# Patient Record
Sex: MALE | Race: BLACK OR AFRICAN AMERICAN | NOT HISPANIC OR LATINO | Employment: FULL TIME | URBAN - METROPOLITAN AREA
[De-identification: names, ages, dates, MRNs, and addresses within clinical notes are randomized per-mention and may not be internally consistent; named-entity substitution may affect disease eponyms.]

---

## 2024-05-07 ENCOUNTER — HOSPITAL ENCOUNTER (OUTPATIENT)
Facility: HOSPITAL | Age: 44
Setting detail: OBSERVATION
Discharge: HOME | End: 2024-05-08
Attending: EMERGENCY MEDICINE
Payer: COMMERCIAL

## 2024-05-07 DIAGNOSIS — T78.3XXA ACE INHIBITOR-AGGRAVATED ANGIOEDEMA, INITIAL ENCOUNTER: Primary | ICD-10-CM

## 2024-05-07 DIAGNOSIS — T46.4X5A ACE INHIBITOR-AGGRAVATED ANGIOEDEMA, INITIAL ENCOUNTER: Primary | ICD-10-CM

## 2024-05-07 PROCEDURE — 99285 EMERGENCY DEPT VISIT HI MDM: CPT

## 2024-05-07 PROCEDURE — G0378 HOSPITAL OBSERVATION PER HR: HCPCS

## 2024-05-07 PROCEDURE — 99284 EMERGENCY DEPT VISIT MOD MDM: CPT | Performed by: EMERGENCY MEDICINE

## 2024-05-07 RX ORDER — ONDANSETRON HYDROCHLORIDE 2 MG/ML
4 INJECTION, SOLUTION INTRAVENOUS EVERY 8 HOURS PRN
Status: DISCONTINUED | OUTPATIENT
Start: 2024-05-07 | End: 2024-05-08 | Stop reason: HOSPADM

## 2024-05-07 RX ORDER — ACETAMINOPHEN 325 MG/1
650 TABLET ORAL EVERY 4 HOURS PRN
Status: DISCONTINUED | OUTPATIENT
Start: 2024-05-07 | End: 2024-05-08 | Stop reason: HOSPADM

## 2024-05-07 RX ORDER — ACETAMINOPHEN 160 MG/5ML
650 SOLUTION ORAL EVERY 4 HOURS PRN
Status: DISCONTINUED | OUTPATIENT
Start: 2024-05-07 | End: 2024-05-08 | Stop reason: HOSPADM

## 2024-05-07 RX ORDER — DIPHENHYDRAMINE HYDROCHLORIDE 50 MG/ML
25 INJECTION INTRAMUSCULAR; INTRAVENOUS EVERY 6 HOURS PRN
Status: DISCONTINUED | OUTPATIENT
Start: 2024-05-07 | End: 2024-05-08 | Stop reason: HOSPADM

## 2024-05-07 RX ORDER — ONDANSETRON 4 MG/1
4 TABLET, ORALLY DISINTEGRATING ORAL EVERY 8 HOURS PRN
Status: DISCONTINUED | OUTPATIENT
Start: 2024-05-07 | End: 2024-05-08 | Stop reason: HOSPADM

## 2024-05-07 RX ORDER — ACETAMINOPHEN 650 MG/1
650 SUPPOSITORY RECTAL EVERY 4 HOURS PRN
Status: DISCONTINUED | OUTPATIENT
Start: 2024-05-07 | End: 2024-05-08 | Stop reason: HOSPADM

## 2024-05-07 RX ORDER — ALBUTEROL SULFATE 0.83 MG/ML
3 SOLUTION RESPIRATORY (INHALATION)
Status: DISCONTINUED | OUTPATIENT
Start: 2024-05-07 | End: 2024-05-08 | Stop reason: HOSPADM

## 2024-05-07 RX ORDER — DIPHENHYDRAMINE HCL 25 MG
25 CAPSULE ORAL EVERY 6 HOURS PRN
Status: DISCONTINUED | OUTPATIENT
Start: 2024-05-07 | End: 2024-05-08 | Stop reason: HOSPADM

## 2024-05-07 RX ORDER — FAMOTIDINE 10 MG/ML
20 INJECTION INTRAVENOUS EVERY 12 HOURS PRN
Status: DISCONTINUED | OUTPATIENT
Start: 2024-05-07 | End: 2024-05-08 | Stop reason: HOSPADM

## 2024-05-07 ASSESSMENT — PAIN SCALES - GENERAL: PAINLEVEL_OUTOF10: 3

## 2024-05-07 ASSESSMENT — PAIN DESCRIPTION - PAIN TYPE: TYPE: ACUTE PAIN

## 2024-05-07 ASSESSMENT — PAIN - FUNCTIONAL ASSESSMENT: PAIN_FUNCTIONAL_ASSESSMENT: 0-10

## 2024-05-07 ASSESSMENT — PAIN DESCRIPTION - LOCATION: LOCATION: FACE

## 2024-05-07 ASSESSMENT — COLUMBIA-SUICIDE SEVERITY RATING SCALE - C-SSRS
6. HAVE YOU EVER DONE ANYTHING, STARTED TO DO ANYTHING, OR PREPARED TO DO ANYTHING TO END YOUR LIFE?: NO
2. HAVE YOU ACTUALLY HAD ANY THOUGHTS OF KILLING YOURSELF?: NO
1. IN THE PAST MONTH, HAVE YOU WISHED YOU WERE DEAD OR WISHED YOU COULD GO TO SLEEP AND NOT WAKE UP?: NO

## 2024-05-07 NOTE — Clinical Note
Pepe Gibbs was seen and treated in our emergency department on 5/7/2024.  He may return to work on 05/10/2024.       If you have any questions or concerns, please don't hesitate to call.      Nas Woodruff PA-C

## 2024-05-07 NOTE — ED TRIAGE NOTES
Pt to ED with c/o L lower lip and L cheek swelling. Pt took his morning meds at 0600 this morning and then the swelling began around 11 am. Pt denies any difficulties swallowing/breathing.

## 2024-05-08 VITALS
BODY MASS INDEX: 20.53 KG/M2 | RESPIRATION RATE: 16 BRPM | OXYGEN SATURATION: 98 % | HEART RATE: 62 BPM | SYSTOLIC BLOOD PRESSURE: 115 MMHG | TEMPERATURE: 97.7 F | WEIGHT: 160 LBS | HEIGHT: 74 IN | DIASTOLIC BLOOD PRESSURE: 85 MMHG

## 2024-05-08 PROCEDURE — 2500000002 HC RX 250 W HCPCS SELF ADMINISTERED DRUGS (ALT 637 FOR MEDICARE OP, ALT 636 FOR OP/ED): Mod: SE

## 2024-05-08 PROCEDURE — 2500000006 HC RX 250 W HCPCS SELF ADMINISTERED DRUGS (ALT 637 FOR ALL PAYERS): Mod: SE

## 2024-05-08 PROCEDURE — S4991 NICOTINE PATCH NONLEGEND: HCPCS | Mod: SE

## 2024-05-08 PROCEDURE — 2500000004 HC RX 250 GENERAL PHARMACY W/ HCPCS (ALT 636 FOR OP/ED): Mod: SE

## 2024-05-08 PROCEDURE — 96374 THER/PROPH/DIAG INJ IV PUSH: CPT

## 2024-05-08 PROCEDURE — 96375 TX/PRO/DX INJ NEW DRUG ADDON: CPT

## 2024-05-08 PROCEDURE — G0378 HOSPITAL OBSERVATION PER HR: HCPCS

## 2024-05-08 RX ORDER — LISINOPRIL 40 MG/1
40 TABLET ORAL
COMMUNITY
Start: 2024-04-06 | End: 2024-05-08

## 2024-05-08 RX ORDER — SPIRONOLACTONE 25 MG/1
25 TABLET ORAL
Status: DISCONTINUED | OUTPATIENT
Start: 2024-05-08 | End: 2024-05-08 | Stop reason: HOSPADM

## 2024-05-08 RX ORDER — IBUPROFEN 200 MG
1 TABLET ORAL EVERY 24 HOURS
Status: DISCONTINUED | OUTPATIENT
Start: 2024-05-08 | End: 2024-05-08 | Stop reason: HOSPADM

## 2024-05-08 RX ORDER — DIPHENHYDRAMINE HCL 25 MG
1 CAPSULE ORAL AS NEEDED
COMMUNITY
Start: 2023-04-03

## 2024-05-08 RX ORDER — METOPROLOL SUCCINATE 50 MG/1
100 TABLET, EXTENDED RELEASE ORAL DAILY
Status: DISCONTINUED | OUTPATIENT
Start: 2024-05-08 | End: 2024-05-08 | Stop reason: HOSPADM

## 2024-05-08 RX ORDER — IBUPROFEN 200 MG
1 TABLET ORAL EVERY 24 HOURS
COMMUNITY
Start: 2024-04-16

## 2024-05-08 RX ORDER — DIPHENHYDRAMINE HCL 25 MG
25 CAPSULE ORAL EVERY 8 HOURS PRN
Qty: 30 CAPSULE | Refills: 0 | Status: SHIPPED | OUTPATIENT
Start: 2024-05-08

## 2024-05-08 RX ORDER — METOPROLOL SUCCINATE 100 MG/1
100 TABLET, EXTENDED RELEASE ORAL
COMMUNITY
Start: 2024-04-05

## 2024-05-08 RX ORDER — ALBUTEROL SULFATE 90 UG/1
2 AEROSOL, METERED RESPIRATORY (INHALATION) EVERY 4 HOURS PRN
COMMUNITY
Start: 2023-05-03

## 2024-05-08 RX ORDER — TALC
3 POWDER (GRAM) TOPICAL NIGHTLY PRN
COMMUNITY
Start: 2024-04-16 | End: 2025-01-11

## 2024-05-08 RX ORDER — SPIRONOLACTONE 25 MG/1
25 TABLET ORAL
COMMUNITY
Start: 2024-04-06

## 2024-05-08 RX ORDER — MICONAZOLE NITRATE 2 %
2 CREAM (GRAM) TOPICAL
COMMUNITY
Start: 2024-04-16

## 2024-05-08 RX ADMIN — SPIRONOLACTONE 25 MG: 25 TABLET, FILM COATED ORAL at 07:11

## 2024-05-08 RX ADMIN — FAMOTIDINE 20 MG: 10 INJECTION INTRAVENOUS at 03:29

## 2024-05-08 RX ADMIN — NICOTINE 1 PATCH: 21 PATCH, EXTENDED RELEASE TRANSDERMAL at 05:47

## 2024-05-08 RX ADMIN — DIPHENHYDRAMINE HYDROCHLORIDE 25 MG: 50 INJECTION INTRAMUSCULAR; INTRAVENOUS at 03:29

## 2024-05-08 NOTE — DISCHARGE SUMMARY
Discharge Diagnosis  ACE inhibitor-aggravated angioedema, initial encounter      Disposition Note  [facility] CLINICAL DECISION  Patient: Pepe Gibbs Jr.  MRN: [id number]  : 1980  Date of Evaluation: 2024  ED Provider: Nas Woodruff PA-C      Limitations to history: None  Independent Historian: Yes  External Records Reviewed: Recent and relevant inpatient and outpatient notes in EMR      Subjective:    Pepe Gibbs Jr. is a 44 y.o. male has undergone comprehensive diagnostic evaluation and therapeutic management in accordance with the CDU guidelines for angioedema of the lower lip. Based on Mr. Sai JASSO clinical response and diagnostic information during this period of observation, it has been determined that the patient will be discharged.    The acute evaluation included:  Orders Placed This Encounter   Procedures    Adult diet Regular    Weight on admission    Height on admission    Nursing swallow assessment    Activity (specify) Ambulate    Vital Signs    Telemetry monitoring    Notify provider    Pain Assessment    Monitor airway    Full code    Pulse oximetry, continuous    Electrocardiogram, 12-lead PRN ACS symptoms    Insert and maintain peripheral IV    Saline lock IV    Send to CDU    Initiate observation status         Placed in observation at: 2125       Past History   No past medical history on file.  No past surgical history on file.  Social History     Socioeconomic History    Marital status: Single     Spouse name: Not on file    Number of children: Not on file    Years of education: Not on file    Highest education level: Not on file   Occupational History    Not on file   Tobacco Use    Smoking status: Not on file    Smokeless tobacco: Not on file   Substance and Sexual Activity    Alcohol use: Not on file    Drug use: Not on file    Sexual activity: Not on file   Other Topics Concern    Not on file   Social History Narrative    Not on file     Social  Determinants of Health     Financial Resource Strain: High Risk (4/17/2024)    Received from [employer]    Overall Financial Resource Strain (CARDIA)     Difficulty of Paying Living Expenses: Hard   Food Insecurity: Food Insecurity Present (4/17/2024)    Received from [employer]    Hunger Vital Sign     Worried About Running Out of Food in the Last Year: Often true     Ran Out of Food in the Last Year: Often true   Transportation Needs: No Transportation Needs (4/17/2024)    Received from [employer]    PRAPARE - Transportation     Lack of Transportation (Medical): No     Lack of Transportation (Non-Medical): No   Physical Activity: Sufficiently Active (4/17/2024)    Received from [employer]    Exercise Vital Sign     Days of Exercise per Week: 7 days     Minutes of Exercise per Session: 30 min   Stress: No Stress Concern Present (4/17/2024)    Received from [employer]    [place] [employer] - Occupational Stress Questionnaire     Feeling of Stress : Only a little   Social Connections: Moderately Isolated (4/17/2024)    Received from [employer]    Social Connection and Isolation Panel [NHANES]     Frequency of Communication with Friends and Family: More than three times a week     Frequency of Social Gatherings with Friends and Family: Twice a week     Attends [employer] Services: More than 4 times per year     Active Member of Clubs or Organizations: No     Attends Club or Organization Meetings: Never     Marital Status: Never    Intimate Partner Violence: Not At Risk (4/17/2024)    Received from [employer]    Humiliation, Afraid, Rape, and Kick questionnaire     Fear of Current or Ex-Partner: No     Emotionally Abused: No     Physically Abused: No     Sexually Abused: No   Housing Stability: Low Risk  (4/17/2024)    Received from [employer]    Housing Stability Vital Sign     Unable to Pay for Housing in the Last Year: No     Number of Places Lived in the Last Year: 1     Unstable  "Housing in the Last Year: No         Medications/Allergies     Previous Medications    ALBUTEROL 90 MCG/ACTUATION INHALER    Inhale 2 puffs every 4 hours if needed for wheezing or shortness of breath.    MELATONIN 3 MG TABLET    Take 1 tablet (3 mg) by mouth as needed at bedtime for sleep.    METOPROLOL SUCCINATE XL (TOPROL-XL) 100 MG 24 HR TABLET    Take 1 tablet (100 mg) by mouth once daily.    NICOTINE (NICODERM CQ) 21 MG/24 HR PATCH    Place 1 patch on the skin once every 24 hours.    NICOTINE POLACRILEX (NICORETTE) 2 MG GUM    Take 1 each (2 mg) by mouth.    NICOTINE POLACRILEX (NICORETTE) 4 MG GUM    Take 1 each (4 mg) by mouth if needed.    SPIRONOLACTONE (ALDACTONE) 25 MG TABLET    Take 1 tablet (25 mg) by mouth once daily.     Allergies   Allergen Reactions    Tree Nuts Anaphylaxis    Lisinopril Angioedema         Review of Systems  All systems reviewed and otherwise negative, except as stated above in HPI.    Diagnostics reviewed by Nas Woodruff PA-C     Labs:  No results found for this or any previous visit.  Radiographs:  No orders to display           Physical Exam     Visit Vitals  /85 (BP Location: Right arm, Patient Position: Lying)   Pulse 62   Temp 36.5 °C (97.7 °F) (Skin)   Resp 16   Ht 1.88 m (6' 2\")   Wt 72.6 kg (160 lb)   SpO2 98%   BMI 20.54 kg/m²   BSA 1.95 m²       Physical exam  VS: As documented in the triage note and EMR flowsheet from this visit were reviewed.    General: Patient is AAOx3, appears well developed, well nourished, is a good historian, answers questions appropriately    HEENT: head normocephalic, atraumatic, PERRLA, EOMs intact, minor swelling to the lower left lip area and left lower facial area. oropharynx without erythema or exudate, buccal mucosa intact without lesions, nose is patent bilateral    Neck: supple, full ROM, negative for lymphadenopathy, JVD, thyromegaly, tracheal deviation, nuchal rigidity    Pulmonary: Clear to auscultation bilaterally, No " wheezing, rales, or rhonchi, no accessory muscle use, able to speak full clear sentences    Cardiac: Normal rate and rhythm, no murmurs, rubs or gallops    GI: soft, non-tender, non-distended, normoactive bowelsounds in all four quadrants, no masses or organomegaly, no guarding or CVA tenderness noted    Musculoskeletal: full weight bearing, CALIXTO, no joint effusions, clubbing or edema noted    Skin: Warm, dry, intact, no lesions or rashes noted, turgor is good.    Neuro: patient follow commands, cranial nerves 2-12 grossly intact, motor strengths 5/5 upper and lower extremities, sensation are symmetrical. No focal deficits.    Psych: Appropriate mood and affect for situation      Consultants  1) N/A      Impression and Plan    In summary, Pepe Gibbs Jr. has been cared for according to the standard [facility] Clinical Decision Unit observation protocol for ACE inhibitor-aggravated angioedema, initial encounter. This extended period of observation was specifically required to determine the need for hospitalization. Prior to discharge from observation, the final physical exam is documented above.     Significant events during the course of observation based on the goals of the clinical problem list include:   1) Remained stable  2) Patient reported symptoms has improved    Based on the patient's condition and test results, the patient will be discharged    Total length of observation was 17 hours. Dr. Henson is the CDU disposition attending.      Discharge Diagnosis  ACE inhibitor-aggravated angioedema, initial encounter    Issues Requiring Follow-Up  See above    Discharge Meds     Your medication list        ASK your doctor about these medications        Instructions Last Dose Given Next Dose Due   albuterol 90 mcg/actuation inhaler           melatonin 3 mg tablet           metoprolol succinate  mg 24 hr tablet  Commonly known as: Toprol-XL           nicotine 21 mg/24 hr  patch  Commonly known as: Nicoderm CQ           nicotine polacrilex 4 mg gum  Commonly known as: Nicorette           nicotine polacrilex 2 mg gum  Commonly known as: Nicorette           spironolactone 25 mg tablet  Commonly known as: Aldactone                    Test Results Pending At Discharge  Pending Labs       No current pending labs.            Hospital Course   Mr. Sai Lopez was admitted to the clinical decision unit for suspected angioedema of the lower lip.  Patient recently began taking lisinopril approximately 1 month ago.  He last took the lisinopril at approximately 0600 hrs. and developed facial swelling around 11 AM.  After initial ED evaluation he was placed into the clinical decision unit for close observation and serial airway assessment.  Patient did remain clinically, hemodynamically and neurologically intact during his CDU admission.  Patient is tolerating p.o. intake without difficulty.  Patient has been observed for greater than 17 hours and on serial reassessment the swelling to his left lower lip area has improved as well as the left facial area.  Patient has no complaints currently.  We have instructed him to discontinue lisinopril.  Patient was encouraged to keep his previously scheduled appointment with his PCP this Friday.  I have instructed the patient to return to the emergency department with any new or worsening symptoms or for any other concerns.  Plan of care discussed with Mr. Gibbs and he verbalized understanding and is in agreement and was discharged home in stable condition.      Outpatient Follow-Up  No future appointments.      Nas Woodruff PA-C

## 2024-05-08 NOTE — H&P
History and Physical  [facility] CLINICAL DECISION UNIT    MRN: [id number]  Date of Placement in CDU: 5/7/2024  Time Placed in Observation: 9:00 PM  ED Provider: Marcia Marsh PA-C      Limitations to history: none  Independent Historians: patient   External Records Reviewed: EMR, outside records, Care-everywhere     Patient History   Pepe Gibbs Jr. is a 44 y.o. male with PMH of HTN, HFrEF (EF 30% on ECHO 03/31/2024), ascending aortic aneurysm, substance use disorder (daily alcohol use, cannabis and tobacco) whom presented to the ED for increased angioedema and left-sided facial swelling since this morning. Patient recently started new BP medication, Lisinopril 40mg but otherwise denied any new medication, known trigger or allergen exposure. He reports swelling onset approximately 5 hours after taking morning lisinopril dose. Patient was afebrile, hemodynamically stable and in no apparent respiratory distress. Airway patent and he had been tolerating oral secretions without issue. Given history and clinical presentation (see picture in ED provider's note), ACE-I induced angioedema was highest likelihood. There were no signs of underlying infection or recent trauma which would warrant additional imaging or lab work-up. However, given angioedema, it was elected that the patient be placed in CDU for airway watch overnight and symptomatic control.     Upon admission to the Clinical Decision Unit, patient is hemodynamically stable and tolerating clear liquids without difficulty. He denies any pain, tinging numbness to tongue, difficuly breathing, fever, chills or other systemic complaints. On my initial evaluation, there is focal swelling and angioedema confined to left side of the lower lip and cheek. No trismus or drooling. However, on re-evaluation at 4am, swelling appears to migrated with now diffuse swelling to entire lower lip. He continues to deny any active medical complaints or concerns and  airway is still protected. Will administer Benadryl and pepcid with steroid for symptomatic support.    The acute ED evaluation included:  Orders Placed This Encounter   Procedures    Adult diet Clear Liquid    Weight on admission    Height on admission    Nursing swallow assessment    Activity (specify) Ambulate    Vital Signs    Telemetry monitoring    Notify provider    Pain Assessment    Monitor airway    Full code    Pulse oximetry, continuous    Electrocardiogram, 12-lead PRN ACS symptoms    Insert and maintain peripheral IV    Saline lock IV    Send to CDU    Initiate observation status         Past Medical History: reviewed, see EMR and HPI  Past Surgical History: reviewed, see EMR  Social History: (+) tobacco use (cigarettes) and alcohol use. No illict substance use.  Family History: Non-contributory  Allergies: reviewed      Medications: reviewed  Previous Medications    ALBUTEROL 90 MCG/ACTUATION INHALER    Inhale 2 puffs every 4 hours if needed for wheezing or shortness of breath.    MELATONIN 3 MG TABLET    Take 1 tablet (3 mg) by mouth as needed at bedtime for sleep.    METOPROLOL SUCCINATE XL (TOPROL-XL) 100 MG 24 HR TABLET    Take 1 tablet (100 mg) by mouth once daily.    NICOTINE (NICODERM CQ) 21 MG/24 HR PATCH    Place 1 patch on the skin once every 24 hours.    NICOTINE POLACRILEX (NICORETTE) 2 MG GUM    Take 1 each (2 mg) by mouth.    NICOTINE POLACRILEX (NICORETTE) 4 MG GUM    Take 1 each (4 mg) by mouth if needed.    SPIRONOLACTONE (ALDACTONE) 25 MG TABLET    Take 1 tablet (25 mg) by mouth once daily.       Scheduled medications  metoprolol succinate XL, 100 mg, oral, Daily  nicotine, 1 patch, transdermal, q24h  spironolactone, 25 mg, oral, Daily      Continuous medications     PRN medications  PRN medications: acetaminophen **OR** acetaminophen **OR** acetaminophen, albuterol, diphenhydrAMINE **OR** diphenhydrAMINE, famotidine PF, ondansetron ODT **OR** ondansetron, oxygen       Review of  "Systems   All systems reviewed and otherwise negative, except as stated above in HPI.      Physical Examination     Visit Vitals  /88   Pulse 63   Temp 36.6 °C (97.8 °F) (Temporal)   Resp 16   Ht 1.88 m (6' 2\")   Wt 72.6 kg (160 lb)   SpO2 97%   BMI 20.54 kg/m²   BSA 1.95 m²       VS reviewed and noted NAD. Afebrile.   General: Patient is awake, conversant, well-nourished and overall well-appearing.    Head: NC/AT. No apparent traumatic injuries.   Eyes: EOMI, sclerae anicteric    ENMT: Hearing grossly intact. TMs clear. MMM without lesions. (+) Lower lip is now diffusely swollen without any warmth, purulence or evidence of infection to surrounding skin. Facial swelling has near completely resolved. Denition is poor, no PTA or dental abscesses noted. Posterior oropharynx unremarkable. Normal phonation without stridor, drooling or trismus.   Neck: Supple, full ROM without meningismus. No lymphadenopathy. Trachea midline.   Cardiac: Regular rate & rhythm. No murmur, gallops or rubs.    Lungs: CTAB with normal respiratory effort and good aeration throughout.    Abdomen: Soft, non-tender, non-distended. Normoactive bowel sounds.   Vascular: Pulses full and equal bilaterally. No cyanosis, clubbing or pitting LE edema. Capillary refill < 2s   Skin: Warm and intact without rashes, lesions or discoloration.    Neuro: CN II-XII grossly intact. A&Ox3. Speech is clear and fluent. No focal deficits identified.   Psychiatric: Mood and affect are appropriate for situation.      Diagnostic Evaluation   I reviewed the below labs and imaging as ordered by the ED provider:  No orders to display       Labs Reviewed - No data to display    Diagnostic studies and ED interventions germane to this period of clinical observation will include: Serial vital signs, Airway watch and monitoring, PRN analgesics, antiemetics, antihistamines and anticids        Consultants (if applicable)  1) N/A      Assessment and Plan   In summary, " Pepe Gibbs Jr. is admitted to the [facility] Clinical Decision Unit for ACE-I induced angioedema. Dr. Henson is the CDU admission attending.    This patient has been risk-stratified based on available history, physical exam, and related study findings. Admission to the observation status for further diagnosis/treatment/monitoring of patient's condition is warranted clinically. This extended period of observation is specifically required to determine the need for hospitalization.     The goals of this admission based on the patient’s clinical problem list are:  1) ACE-i Induced Angioedema     -- Discontinue ACE-inhibitor (lisinopril)     -- Serial VS q4HRs     -- Airway watch     -- Bedside nursing swallow evaluation     -- Cardiac telemetry, continuous pulse oximetry     -- Symptom support:   PRN IV pepcid 20mg x 1 + Benadryl 25mg for itching or allergic rxn   Duo-Nebs q20 min PRN wheezing or SOB, x 3 doses   Zofran 4mg PRN nausea/vomiting     -- Liquid diet, advance to regular diet as tolerated     -- Initiate new BP med (ARB) in AM     -- Schedule PCP follow-up in 1-2 weeks for BP check and possible med adjustments.    2) HTN     -- Monitor BP's     -- Discontinue lisinopril due to suspected ACE-I associated angioedema     -- Consider initiation of ARB in AM     -- Outpt PCP follow-up in 1-2 weeks for BP recheck and medication adjustment    We will observe the patient for the following endpoints:   Stable vital signs  No further progression of angioedema or extension of facial swelling  Ability to tolerate oral medication and p.o fluids  No new systemic symptoms or complaints to raise suspicion for infectious etiology  Scheduled follow-up with PCP for BP recheck and med adjustement    When met, appropriate disposition will be arranged.      Marcia Marsh PA-C  [facility]3]  Emergency Dept.

## 2024-05-08 NOTE — ED PROVIDER NOTES
HPI   Chief Complaint   Patient presents with    Facial Swelling    Oral Swelling       HPI  44-year-old male with history of hypertension (on lisinopril) who presents with facial swelling.  Patient states he took his blood pressure medication this morning around 0600, he subsequently developed facial swelling around 11.  Denies any shortness of breath, wheeze, abdominal pain, nausea, vomiting.  Denies any food intake immediately prior to the oral swelling.  Denies any itching, any tooth pain, fevers, chills.  He states he recently started taking lisinopril within the last month.                  Migue Coma Scale Score: 15                     Patient History   No past medical history on file.  No past surgical history on file.  No family history on file.  Social History     Tobacco Use    Smoking status: Not on file    Smokeless tobacco: Not on file   Substance Use Topics    Alcohol use: Not on file    Drug use: Not on file       Physical Exam   ED Triage Vitals [05/07/24 1727]   Temperature Heart Rate Respirations BP   36.6 °C (97.8 °F) 98 17 102/70      Pulse Ox Temp Source Heart Rate Source Patient Position   96 % Temporal Monitor Sitting      BP Location FiO2 (%)     Right arm --       Physical Exam  Vitals and nursing note reviewed.   Constitutional:       Appearance: Normal appearance.   HENT:      Head: Normocephalic.      Mouth/Throat:      Mouth: Mucous membranes are moist.      Comments: Focal swelling of the left lower lip as well as left cheek, no obvious pulpitis or periapical abscesses  Eyes:      Conjunctiva/sclera: Conjunctivae normal.   Cardiovascular:      Rate and Rhythm: Normal rate.   Pulmonary:      Effort: Pulmonary effort is normal.      Breath sounds: Normal breath sounds. No wheezing.   Abdominal:      General: Abdomen is flat.      Palpations: Abdomen is soft.      Tenderness: There is no abdominal tenderness.   Neurological:      Mental Status: He is alert and oriented to person, place,  and time.   Psychiatric:         Mood and Affect: Mood normal.         ED Course & MDM   Diagnoses as of 05/07/24 2105   ACE inhibitor-aggravated angioedema, initial encounter       Medical Decision Making  44-year-old male with history of hypertension on lisinopril who presents with facial swelling.  On exam, patient's vitals are normal, he is nontoxic and in no acute distress, he does have focal swelling of the left lower lip as well as left cheek, otherwise there is no obvious swelling within the oropharynx including no tongue swelling, no uvula swelling or deviation, patient is tolerating secretions without issue.  There is no obvious pulpitis or periapical abscess to explain the swelling.  Suspect ACE inhibitor angioedema.  Patient will be admitted to the CDU for observation for airway watch and initiation of new antihypertensive along with discontinuation of his lisinopril.    Procedure  Procedures     Klaus Valerio DO  Resident  05/07/24 2105